# Patient Record
Sex: MALE | Race: WHITE | NOT HISPANIC OR LATINO | Employment: FULL TIME | URBAN - METROPOLITAN AREA
[De-identification: names, ages, dates, MRNs, and addresses within clinical notes are randomized per-mention and may not be internally consistent; named-entity substitution may affect disease eponyms.]

---

## 2023-09-22 DIAGNOSIS — Z00.00 HEALTH MAINTENANCE EXAMINATION: ICD-10-CM

## 2023-09-25 DIAGNOSIS — Z00.00 ROUTINE HEALTH MAINTENANCE: ICD-10-CM

## 2023-10-06 DIAGNOSIS — Z00.00 HEALTH MAINTENANCE EXAMINATION: ICD-10-CM

## 2023-10-07 RX ORDER — PROPRANOLOL HYDROCHLORIDE 80 MG/1
TABLET ORAL
COMMUNITY
End: 2023-10-09 | Stop reason: SDUPTHER

## 2023-10-07 RX ORDER — MELATONIN 10 MG
CAPSULE ORAL
COMMUNITY

## 2023-10-09 ENCOUNTER — NUTRITION (OUTPATIENT)
Dept: PRIMARY CARE | Facility: CLINIC | Age: 43
End: 2023-10-09

## 2023-10-09 ENCOUNTER — HOSPITAL ENCOUNTER (OUTPATIENT)
Dept: RADIOLOGY | Facility: HOSPITAL | Age: 43
Discharge: HOME | End: 2023-10-09

## 2023-10-09 ENCOUNTER — ALLIED HEALTH (OUTPATIENT)
Dept: INTEGRATIVE MEDICINE | Facility: CLINIC | Age: 43
End: 2023-10-09

## 2023-10-09 ENCOUNTER — HOSPITAL ENCOUNTER (OUTPATIENT)
Dept: CARDIOLOGY | Facility: HOSPITAL | Age: 43
Discharge: HOME | End: 2023-10-09

## 2023-10-09 ENCOUNTER — OFFICE VISIT (OUTPATIENT)
Dept: PRIMARY CARE | Facility: CLINIC | Age: 43
End: 2023-10-09

## 2023-10-09 VITALS — BODY MASS INDEX: 23.59 KG/M2 | HEIGHT: 73 IN | WEIGHT: 178 LBS

## 2023-10-09 VITALS
HEART RATE: 72 BPM | HEIGHT: 73 IN | DIASTOLIC BLOOD PRESSURE: 62 MMHG | SYSTOLIC BLOOD PRESSURE: 122 MMHG | BODY MASS INDEX: 23.59 KG/M2 | WEIGHT: 178 LBS | OXYGEN SATURATION: 98 %

## 2023-10-09 DIAGNOSIS — Z00.00 HEALTH MAINTENANCE EXAMINATION: ICD-10-CM

## 2023-10-09 DIAGNOSIS — Z00.00 ROUTINE HEALTH MAINTENANCE: ICD-10-CM

## 2023-10-09 DIAGNOSIS — Z00.00 HEALTH MAINTENANCE EXAMINATION: Primary | ICD-10-CM

## 2023-10-09 DIAGNOSIS — Z00.00 HEALTHCARE MAINTENANCE: Primary | ICD-10-CM

## 2023-10-09 LAB
25(OH)D3 SERPL-MCNC: 36 NG/ML (ref 30–100)
ALBUMIN SERPL BCP-MCNC: 4.5 G/DL (ref 3.4–5)
ALP SERPL-CCNC: 64 U/L (ref 33–120)
ALT SERPL W P-5'-P-CCNC: 18 U/L (ref 10–52)
ANION GAP SERPL CALC-SCNC: 13 MMOL/L (ref 10–20)
AST SERPL W P-5'-P-CCNC: 21 U/L (ref 9–39)
BASOPHILS # BLD AUTO: 0.08 X10*3/UL (ref 0–0.1)
BASOPHILS NFR BLD AUTO: 2.1 %
BILIRUB SERPL-MCNC: 0.6 MG/DL (ref 0–1.2)
BUN SERPL-MCNC: 21 MG/DL (ref 6–23)
CALCIUM SERPL-MCNC: 9.5 MG/DL (ref 8.6–10.3)
CHLORIDE SERPL-SCNC: 104 MMOL/L (ref 98–107)
CHOLEST SERPL-MCNC: 260 MG/DL (ref 0–199)
CHOLESTEROL/HDL RATIO: 3.4
CO2 SERPL-SCNC: 27 MMOL/L (ref 21–32)
CREAT SERPL-MCNC: 1.08 MG/DL (ref 0.5–1.3)
CRP SERPL HS-MCNC: 0.9 MG/L
EOSINOPHIL # BLD AUTO: 0.12 X10*3/UL (ref 0–0.7)
EOSINOPHIL NFR BLD AUTO: 3.2 %
ERYTHROCYTE [DISTWIDTH] IN BLOOD BY AUTOMATED COUNT: 12.6 % (ref 11.5–14.5)
EST. AVERAGE GLUCOSE BLD GHB EST-MCNC: 91 MG/DL
FERRITIN SERPL-MCNC: 324 NG/ML (ref 20–300)
GFR SERPL CREATININE-BSD FRML MDRD: 87 ML/MIN/1.73M*2
GLUCOSE SERPL-MCNC: 98 MG/DL (ref 74–99)
HBA1C MFR BLD: 4.8 %
HCT VFR BLD AUTO: 45.5 % (ref 41–52)
HDLC SERPL-MCNC: 77.3 MG/DL
HGB BLD-MCNC: 14.8 G/DL (ref 13.5–17.5)
IMM GRANULOCYTES # BLD AUTO: 0.01 X10*3/UL (ref 0–0.7)
IMM GRANULOCYTES NFR BLD AUTO: 0.3 % (ref 0–0.9)
INSULIN P FAST SERPL-ACNC: 4 UIU/ML (ref 3–25)
IRON SATN MFR SERPL: 40 % (ref 25–45)
IRON SERPL-MCNC: 130 UG/DL (ref 35–150)
LDLC SERPL CALC-MCNC: 173 MG/DL (ref 140–190)
LYMPHOCYTES # BLD AUTO: 1.3 X10*3/UL (ref 1.2–4.8)
LYMPHOCYTES NFR BLD AUTO: 34.4 %
MAGNESIUM SERPL-MCNC: 2.4 MG/DL (ref 1.6–2.4)
MCH RBC QN AUTO: 30.3 PG (ref 26–34)
MCHC RBC AUTO-ENTMCNC: 32.5 G/DL (ref 32–36)
MCV RBC AUTO: 93 FL (ref 80–100)
MONOCYTES # BLD AUTO: 0.26 X10*3/UL (ref 0.1–1)
MONOCYTES NFR BLD AUTO: 6.9 %
NEUTROPHILS # BLD AUTO: 2.01 X10*3/UL (ref 1.2–7.7)
NEUTROPHILS NFR BLD AUTO: 53.1 %
NON HDL CHOLESTEROL: 183 MG/DL (ref 0–149)
NRBC BLD-RTO: 0 /100 WBCS (ref 0–0)
PLATELET # BLD AUTO: 220 X10*3/UL (ref 150–450)
PMV BLD AUTO: 9.9 FL (ref 7.5–11.5)
POC APPEARANCE, URINE: CLEAR
POC BILIRUBIN, URINE: NEGATIVE
POC BLOOD, URINE: NEGATIVE
POC COLOR, URINE: YELLOW
POC GLUCOSE, URINE: NEGATIVE MG/DL
POC KETONES, URINE: NEGATIVE MG/DL
POC LEUKOCYTES, URINE: NEGATIVE
POC NITRITE,URINE: NEGATIVE
POC PH, URINE: 6 PH
POC PROTEIN, URINE: NEGATIVE MG/DL
POC SPECIFIC GRAVITY, URINE: <=1.005
POC UROBILINOGEN, URINE: 0.2 EU/DL
POTASSIUM SERPL-SCNC: 4.6 MMOL/L (ref 3.5–5.3)
PROT SERPL-MCNC: 6.6 G/DL (ref 6.4–8.2)
PSA SERPL-MCNC: 1.22 NG/ML
RBC # BLD AUTO: 4.88 X10*6/UL (ref 4.5–5.9)
SODIUM SERPL-SCNC: 139 MMOL/L (ref 136–145)
TIBC SERPL-MCNC: 326 UG/DL (ref 240–445)
TRIGL SERPL-MCNC: 51 MG/DL (ref 0–149)
TSH SERPL-ACNC: 3.21 MIU/L (ref 0.44–3.98)
UIBC SERPL-MCNC: 196 UG/DL (ref 110–370)
URATE SERPL-MCNC: 5.5 MG/DL (ref 4–7.5)
VIT B12 SERPL-MCNC: 549 PG/ML (ref 211–911)
VLDL: 10 MG/DL (ref 0–40)
WBC # BLD AUTO: 3.8 X10*3/UL (ref 4.4–11.3)

## 2023-10-09 PROCEDURE — 93016 CV STRESS TEST SUPVJ ONLY: CPT | Performed by: INTERNAL MEDICINE

## 2023-10-09 PROCEDURE — 80061 LIPID PANEL: CPT

## 2023-10-09 PROCEDURE — 83525 ASSAY OF INSULIN: CPT

## 2023-10-09 PROCEDURE — 83036 HEMOGLOBIN GLYCOSYLATED A1C: CPT

## 2023-10-09 PROCEDURE — 84153 ASSAY OF PSA TOTAL: CPT

## 2023-10-09 PROCEDURE — NUTCO NUTRITION CONSULTATION: Performed by: DIETITIAN, REGISTERED

## 2023-10-09 PROCEDURE — EXAM4 EXAM 4: Performed by: INTERNAL MEDICINE

## 2023-10-09 PROCEDURE — 83550 IRON BINDING TEST: CPT

## 2023-10-09 PROCEDURE — SMAR2 SMART-UHCSM: Performed by: NURSE PRACTITIONER

## 2023-10-09 PROCEDURE — BODTM BONE DENSITY TESTING/MEDISCAN: Performed by: INTERNAL MEDICINE

## 2023-10-09 PROCEDURE — 83735 ASSAY OF MAGNESIUM: CPT

## 2023-10-09 PROCEDURE — 1036F TOBACCO NON-USER: CPT | Performed by: INTERNAL MEDICINE

## 2023-10-09 PROCEDURE — 82306 VITAMIN D 25 HYDROXY: CPT

## 2023-10-09 PROCEDURE — 84402 ASSAY OF FREE TESTOSTERONE: CPT

## 2023-10-09 PROCEDURE — 71046 X-RAY EXAM CHEST 2 VIEWS: CPT | Performed by: RADIOLOGY

## 2023-10-09 PROCEDURE — AUDIO AUDIO HEARING TEST: Performed by: INTERNAL MEDICINE

## 2023-10-09 PROCEDURE — 93000 ELECTROCARDIOGRAM COMPLETE: CPT | Performed by: INTERNAL MEDICINE

## 2023-10-09 PROCEDURE — 84443 ASSAY THYROID STIM HORMONE: CPT

## 2023-10-09 PROCEDURE — 86141 C-REACTIVE PROTEIN HS: CPT

## 2023-10-09 PROCEDURE — 80053 COMPREHEN METABOLIC PANEL: CPT

## 2023-10-09 PROCEDURE — 93017 CV STRESS TEST TRACING ONLY: CPT

## 2023-10-09 PROCEDURE — 93015 CV STRESS TEST SUPVJ I&R: CPT | Performed by: INTERNAL MEDICINE

## 2023-10-09 PROCEDURE — 93018 CV STRESS TEST I&R ONLY: CPT | Performed by: INTERNAL MEDICINE

## 2023-10-09 PROCEDURE — 82270 OCCULT BLOOD FECES: CPT | Performed by: INTERNAL MEDICINE

## 2023-10-09 PROCEDURE — 71046 X-RAY EXAM CHEST 2 VIEWS: CPT | Mod: FY

## 2023-10-09 PROCEDURE — 36415 COLL VENOUS BLD VENIPUNCTURE: CPT

## 2023-10-09 PROCEDURE — BOMIX BODY MASS INDEX: Performed by: INTERNAL MEDICINE

## 2023-10-09 PROCEDURE — 85025 COMPLETE CBC W/AUTO DIFF WBC: CPT

## 2023-10-09 PROCEDURE — 82607 VITAMIN B-12: CPT

## 2023-10-09 PROCEDURE — 82172 ASSAY OF APOLIPOPROTEIN: CPT

## 2023-10-09 PROCEDURE — 83540 ASSAY OF IRON: CPT

## 2023-10-09 PROCEDURE — 82728 ASSAY OF FERRITIN: CPT

## 2023-10-09 PROCEDURE — 84550 ASSAY OF BLOOD/URIC ACID: CPT

## 2023-10-09 PROCEDURE — 81002 URINALYSIS NONAUTO W/O SCOPE: CPT | Performed by: INTERNAL MEDICINE

## 2023-10-09 RX ORDER — PROPRANOLOL HYDROCHLORIDE 80 MG/1
80 TABLET ORAL DAILY PRN
Qty: 30 TABLET | Refills: 1 | Status: SHIPPED | OUTPATIENT
Start: 2023-10-09 | End: 2023-12-08

## 2023-10-09 RX ORDER — CHOLECALCIFEROL (VITAMIN D3) 25 MCG
25 TABLET ORAL DAILY
COMMUNITY

## 2023-10-09 NOTE — PROGRESS NOTES
Jobs/duty: Chad barry based in [facility], travels domestically and internationally.   Every 2 months in [place]. Domestic travel is not that high.   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 Conference calls outside of normal work hours. Once a week.     Four children (college, two in high school one in 6th grade)     Personal time in the morning before everyone wakes up. Runs around with kids all the time. Make time to read.     Sleep hygiene - insomniac for 18 years. Average is 6 hours a night. Less video game/sports just before bed, instead reading is incorporated. Control alcohol before bed-     Identifiable stressors- basic work stress,, presentations, pressure to perform.   -College students (financial)   -Family in [place], far away from here.     Plan:   Sleep hygiene module, sleep factor, cut coffee around 930/10 am (substitute with decaf)  Guided meditation - apps Calm, Insight timer, fitmind, headspace  -4-7-8 breathing method practice 4-6 times a day.   -Sleep factor supplement- consider taking 45 minutes before bed.

## 2023-10-09 NOTE — PROGRESS NOTES
"Assessment     Reason for Visit:  Chaz Oates is a 43 y.o. male who presents for Nutrition Counseling (It was a pleasure speaking with Mr. Oates again to discuss diet and nutrition as part of his executive physical follow-up.)    Anthropometrics:  Anthropometrics  Height: 184.2 cm (6' 0.5\")  Weight: 80.7 kg (178 lb)  BMI (Calculated): 23.8  Weight Change  Weight History / % Weight Change: 193lbs (2/25/22) to 178lbs today (15lb weight loss in over 1.5 years)  Significant Weight Loss: Yes         Food And Nutrient Intake:  Food and Nutrient History  Food and Nutrient History: Mr. Oates has lost 15lbs since his previous executive physical (2.25.22, 193lbs).  He reports that he is fasting on Mondays and will skip breakfast and lunch and only eat dinner on those days. He is eating lunch more often.  He is skipping lunch 3 days a week, where it used to be 5 days a week.  He was doing a keto diet since 2019.  He still does a low carb diet, but he is not as strict. He will occasionally have low carb breads and tortillas.  He does a few months out of the year with eliminating alcohol. He is eating out 1-2 evenings a week.  Most of his dinners are prepared at home.  When he does eat lunch, he has been using a protein bar (Quest, low carb).  He has cut back on his cheese intake. He is eating 4 cans of tuna a week.  He also reports that he is exercising more.  Fluid Intake: Water-(carbonated water such as [place]), 5 cans a day, still water with Nuun when he is running, 750ml; coffee-3 cups a day (black); herbal or green tea-5 days a week; alcohol-4 days a week, 2-3 drinks  GI Symptoms: none (He has a bowel movement everyday.)  Sleep Duration/Quality:  (5-6 hours a night.  He has difficulty falling asleep (sometimes he takes melatonin to help him fall asleep).  Sometimes he has difficulty staying asleep.  He wakes up at 5:30am everyday.)     He sleeps better when he is avoiding alcohol.  He is also trying to limit " screen time at night and does report that he sleeps better when he reads before bed.  He meditates in the morning, not in the evening.      Food Intake  Meal 1: Breakfast (8:00am): 2 egg omelet with vegetalbes, 1 cup berries (blueberries/raspberries)  Meal 2: Lunch (1:00pm): salad-2 cups arugula, 1 cup leftover green beans with mustard and anchovies, 1 can tuna, byron seeds, 6 cherry tomatoes, 6 olives, 100 calories of parmegiano cheese  Meal 3: Dinner (6:30pm): 1/4lb hamburger on 2 pieces low carb bread with mustard, 1 cup arugula with onion, cherry tomato, avocado, lemon vinaigrette    Monday 10/2-fasting, no breakfast or lunch  B: skipped  L: skipped  D (6:30pm): 6oz salmon with tomato, olive oil, bell pepper topping; small side salad with blue cheese crumbles, vinaigrette dressing; 2 large cauliflower steaks, 5 stalks asparagus; 3 glasses Pinot Grigio wine  S (9:00pm): 2 servings pistachios    Tuesday 10/3  B (7:00am): smoothie-1/2 cup Greek yogurt, 2 cups spinach, 1 cup berries, 1.5 cups low carb chocolate almond milk, 1 Tbsp almond butter, 1 scoop Ghost vegan protein powder, cinnamon, turmeric  L: skipped  D (6:30pm): business dinner-(appetizer) pepper stuffed with sausage x 2, 4oz salmon, 5 stalks asparagus, 1 cup sauteed spinach, Barry, 2 glasses wine    Wednesday 10/4  B (6:30am): 2 egg omelet, 3oz meat, jalapeno, onion; 1/4 cup blueberries  L: skipped  D (6:30pm): 8oz grilled chicken, 2 low carb tortillas, 2 Tbsp sour cream, 4 Tbsp guacamole, 100 calories worth of parmigiano cheese, 1 serving pistachios, 1 cup broccoli, 1/2 raw red pepper      Micronutrient Intake  Vitamin Intake: D    Physical Activities:  Physical Activity  Physical Activity History: He worked with PT to help his knee pain. He has been running consistently for the past 6 months. He is running 20-30 miles a week, 4 days a week. He does strength training 4 days a week, 30-60 minutes.      Energy Needs  Calculated Energy Needs Using  "Equations  Height: 184.2 cm (6' 0.5\")  Weight Used for Equation Calculations: 80.7 kg (178 lb)  Judie Maxwellor Equation (Overweight or Obese Patients): 1748  Equation Chosen to Use by RD: Nehal Smith  Activity Factor: 1.5  Total Energy Needs: 2622  Estimated Protein Needs  Total Protein Estimated Needs (g): 140 g  Method for Estimating Needs: 0.8g/lb  Estimated Fiber Needs  Total Fiber Estimated Needs (g): 30 g        Diagnosis      Nutrition Diagnosis  Patient has Nutrition Diagnosis: Yes  Diagnosis Status (1): Ongoing  Nutrition Diagnosis 1: Altered nutrition related to laboratory values  Related to (1): dietary and physiological factors  As Evidenced by (1): elevated cholesterol (260mg/dl), elevated LDL cholesterol (173mg/dl)  Additional Nutrition Diagnosis: Diagnosis 3  Diagnosis Status (2): Ongoing  Nutrition Diagnosis 2: Inadequate fiber intake  Related to (2): food- and nutrition-related knowledge deficit regarding appropriate fiber intake  As Evidenced by (2): following a low carb diet restricting food sources of fiber  Nutrition Diagnosis 3: Inadequate protein energy intake  Related to (3): food- and nutrition-related knowledge deficit regarding appropriate protein intake  As Evidenced by (3): fasting/skipping meals, food recall showing he is not consistently meeting 140g protein daily    Interventions/Recommendations   Food and Nutrition Delivery  Meals & Snacks: Fiber-modified diet, Protein-modified diet, Modify Composition of Meals/Snacks  Nutrition Education  Nutrition Education Content: Content related nutrition education  Nutrition Counseling  Strategies: Nutrition counseling based on goal setting strategy        Patient Instructions   1) Ensure you are getting adequate amounts of protein consistently throughout the day.  Your daily protein goal is 120-140g.  Aim for a minimum of 40g protein at each meal, striving for 3 meals a day.    2) When you are making an omelet for breakfast, continue to " use 2 eggs mixed with vegetables, but try to add 3oz of lean protein (leftover from dinner) or low-fat cottage cheese to this meal to help you reach your protein goal for this meal.  Also, consider adding a source of fiber and healthy fat such as sliced avocado.    3) When making homemade smoothies, consider switching from the vegan Ghost protein powder (uses sucralose and thickeners) to using a product from the company [employer].  They offer grass fed beef protein powders or vegan protein powders.    4) Try switching to a higher quality protein bar than Quest.  One option is Aloha plant based protein bars; they do provide 26g carbohydrates.  Other low carb higher quality protein bar options are: MariGold protein bars, Tobin bars, EPIC meat bars.    5) Limit tuna to 2 cans per week due to high mercury levels.  Try using alternative canned fish options such as canned salmon, sardines, anchovies.     6) Aim for at least 30g fiber daily.  Continue to include non-starchy vegetables with most of your meals.  Consider adding beans or lentils to your salads.  Also, try using oatmeal as a breakfast option once a week.  Be sure to include adequate protein when consuming oatmeal, such as adding protein powder.     7) On the days that you are fasting, try adding a fiber supplement such as Tomorrow's Nutrition Sunfiber.    8) Supplements to help with stress and sleep:  -Pure Encapsulations magnesium glycinate; can titrate up to 400-500mg at bedtime  -[employer]rapeutics Cortisol Manager, 2 capsules at bedtime  -Integr[employer] Therapeutics HPA Adapt, 2 capsules in the afternoon on an empty stomach    9) Be sure to take your Vitamin D3 supplement with food.  So on the days that you are fasting, take this supplement with dinner.    Monitoring and Evaluation   Food/Nutrient Related History Monitoring  Monitoring and Evaluation Plan: Fiber intake, Protein intake  Biochemical Data, Medical Tests and Procedures  Monitoring and  Evaluation Plan: Lipid profile

## 2023-10-09 NOTE — PROGRESS NOTES
Physical Exam    Name Chaz Oates    Date of Service :10/9/2023      Chaz Oates  43 y.o. is here for an annual physical exam    Past Medical History:   Diagnosis Date    Anxiety 2006    Clotting disorder (CMS/HCC) Factor V Leiden heterozygous    Hypertension White coat high BP, usually ok after deep breathing    Personal history of other infectious and parasitic diseases     History of varicella    Varicella 1985       Past Surgical History:   Procedure Laterality Date    CIRCUMCISION, PRIMARY  1980    COSMETIC SURGERY  2021 most recently [employer] for hyper hydros is, armpits    OTHER SURGICAL HISTORY  02/25/2022    Knee surgery    OTHER SURGICAL HISTORY  02/25/2022    [place] tooth extraction    OTHER SURGICAL HISTORY  02/25/2022    Complete colonoscopy    WISDOM TOOTH EXTRACTION  1999        Social History     Tobacco Use    Smoking status: Never    Smokeless tobacco: Never   Substance Use Topics    Alcohol use: Yes     Alcohol/week: 9.0 standard drinks of alcohol     Types: 6 Glasses of wine, 3 Shots of liquor per week     Comment: Drink 4 days per week, abstain three    Drug use: Never        Social History     Social History Narrative    Not on file       Family History   Problem Relation Name Age of Onset    Other (Factor V Leiden carrier) Mother Cony     Cancer Mother Cony     Blood clot Mother Cony     Clotting disorder Mother Cony     Genetic Testing Mother Cony     Miscarriages / Stillbirths Mother Cony     Hypertension Father Jett     Other (cardiac disorder) Father Jett     Diabetes type II Father Jett     Diabetes Father Jett     Heart disease Father Jett     Thyroid disease Sister      Other (cardiac disorder) Other Grandfather     Mental illness Maternal Grandfather Willy     Cancer Maternal Grandmother Melodie Tiana     Heart disease Paternal Grandfather AJ     Hypertension Paternal Grandfather AJ     Diabetes Paternal Grandmother Hallie     Vision loss Paternal Grandmother Hallie         /62 (BP  "Location: Right arm, Patient Position: Sitting)   Pulse 72   Ht 1.842 m (6' 0.5\")   Wt 80.7 kg (178 lb)   SpO2 98%   BMI 23.81 kg/m²     Physical Exam      Reveals a well-developed male in no acute distress.    HEENT exam  Extraocular motion is intact  Tympanic membranes and external auditory canals are normal  Oropharynx is normal  There is no cervical lymphadenopathy appreciated  The thyroid is within normal limits    Lungs    clear to auscultation and percussion    Cardiovascular   regular rate and rhythm  No murmurs rubs or gallops are appreciated        Abdomen   soft nontender bowel sounds are positive   there is no organomegaly noted    Rectal examination-Normal prostate. Heme Negative      Periphery  Pulses are present without deficits noted  No peripheral edema is noted    Musculoskeletal  Gait is normal  Is no joint erythema or swelling noted  Range of motion is within normal limits  Strength is 5 of 5 without deficits noted    Dermatology  No concerning skin lesions are noted    Neurology  No deficits are noted  Judgment appears appropriate  Mood and affect are appropriate                                              Assessment/Plan     Cardiology- Normal EKG, CT-Cardiac score, Exercise stress test, Carotid and Abdominal Aorta ultrasound studies.     Sleep disruption- Start Magnesium Glycinate 200 mg at bedtime    Continue with Melatonin at bedtime/PRN    Situational Anxiety- Continue with Propranolol 80 mg PRN. Follow up if symptoms persist or worsen.    L knee arthralgia- Continue with home stretching and strengthening exercise.Follow up with orthopedics/PT as needed.    Elevated Total +LDL-Cholesterol levels-  I encourage you to stay active and healthy, and to follow these healthy habits:     Try to increase your intake of fish such as salmon and tuna and try to get 2 to 3 servings of fish per week.  Increase your intake of plant-based protein listed here:    Unprocessed nuts, walnuts, or almonds, " Nuts and Seeds.      Green vegetables such as Broccoli, Peas, Greens, Spinach    Beans, Chickpeas, & Lentils    Other sources include Potatoes, Quinoa, Seaweed, Soymilk, Tempeh, and Tofu.  Increase foods higher in flavonoids found in black tea, green tea, apples, nuts, citrus fruit, berries, and dark chocolate.  You should avoid fried foods, and sugary or starchy foods and sugary drinks, and void saturated fats.    Try not to dine at restaurants more than once per month, and avoid fast food restaurants.      Try to get restful sleep approximately 7-9 hours every night.  Work towards getting 30 minutes of  moderate intensity exercise 4 to 5 days per week.  You should also try to exercise at least one hour per day with light walking.       Cancer screening- Recommend screening colonoscopy at age 45. Normal prostate and lung cancer screening tests.      Skin - Please follow up with dermatology for annual examination.Please use a broad-spectrum sunscreen with an SPF of 30 or higher. Monitor moles for ABCDE ( asymmetry, border irregularity, color changes, diameter> pencil eraser and evolving )     Vaccine- I would  recommend a shingles (Shingrix) vaccine at age 50  onwards.  In addition I would recommend a tetanus booster every 10 years to maintain immunity.  Recommend RSV at age 60 and  pneumonia vaccine (Prevnar 20) at age  65.  Consider COVID-19 booster and  flu shot this fall.      General Wellness Tips: Please continue with a balanced diet and a regular physical activity program for at least 150 minutes/week of moderate exercise and 30 minutes/week of resistance/weight training per week.  Try to get 6 to 8 hours of sleep per night.  Please download the calm or Applangopace theresa from the Theresa Store to assist with stress and sleep management if necessary.     Thank you for attending the  [facility]. Please call me at [phone] should you have any questions/comments about your results.    Maximiliano Park  MD [facility] Master Clinician in Wellness    Senior Attending Physician , [facility] [place]    Clinical     [employer] [employer]    [place], [place]    This note was partially generated using the Dragon voice recognition system.  There may be some incorrect wording ,grammar, spelling or punctuation errors that were not corrected prior to committing the note to the medical record.

## 2023-10-11 LAB — LPA SERPL-MCNC: <6 MG/DL

## 2023-10-14 LAB
TESTOSTERONE FREE (CHAN): 61 PG/ML (ref 35–155)
TESTOSTERONE,TOTAL,LC-MS/MS: 535 NG/DL (ref 250–1100)